# Patient Record
Sex: FEMALE | Race: WHITE | NOT HISPANIC OR LATINO | Employment: FULL TIME | URBAN - METROPOLITAN AREA
[De-identification: names, ages, dates, MRNs, and addresses within clinical notes are randomized per-mention and may not be internally consistent; named-entity substitution may affect disease eponyms.]

---

## 2017-07-21 ENCOUNTER — HOSPITAL ENCOUNTER (OUTPATIENT)
Dept: CARDIOLOGY | Facility: HOSPITAL | Age: 32
Discharge: HOME | End: 2017-07-21
Attending: INTERNAL MEDICINE | Admitting: INTERNAL MEDICINE

## 2018-09-24 ENCOUNTER — HOSPITAL ENCOUNTER (OUTPATIENT)
Dept: CARDIOLOGY | Facility: HOSPITAL | Age: 33
Discharge: HOME | End: 2018-09-24
Attending: INTERNAL MEDICINE | Admitting: INTERNAL MEDICINE

## 2018-10-24 ENCOUNTER — HOSPITAL ENCOUNTER (OUTPATIENT)
Dept: CARDIOLOGY | Facility: HOSPITAL | Age: 33
Discharge: HOME | End: 2018-10-24
Attending: INTERNAL MEDICINE | Admitting: INTERNAL MEDICINE

## 2018-10-26 ENCOUNTER — HOSPITAL ENCOUNTER (OUTPATIENT)
Dept: LAB | Facility: HOSPITAL | Age: 33
Discharge: HOME | End: 2018-10-26
Attending: INTERNAL MEDICINE | Admitting: INTERNAL MEDICINE

## 2018-10-26 LAB
MAGNESIUM SERPL-MCNC: 1.8 MG/DL (ref 1.8–2.5)
POTASSIUM SERPL-SCNC: 4.2 MMOL/L (ref 3.6–5.1)

## 2019-02-06 ENCOUNTER — HOSPITAL ENCOUNTER (OUTPATIENT)
Dept: SLEEP MEDICINE | Facility: HOSPITAL | Age: 34
Discharge: HOME | End: 2019-02-06
Attending: PSYCHIATRY & NEUROLOGY | Admitting: PSYCHIATRY & NEUROLOGY

## 2019-10-30 ENCOUNTER — OFFICE VISIT (OUTPATIENT)
Dept: NEUROLOGY | Facility: CLINIC | Age: 34
End: 2019-10-30

## 2019-10-30 VITALS
HEART RATE: 83 BPM | DIASTOLIC BLOOD PRESSURE: 84 MMHG | WEIGHT: 190.1 LBS | SYSTOLIC BLOOD PRESSURE: 127 MMHG | HEIGHT: 66 IN | BODY MASS INDEX: 30.55 KG/M2

## 2019-10-30 DIAGNOSIS — G47.50 PARASOMNIA, UNSPECIFIED TYPE: Primary | ICD-10-CM

## 2019-10-30 PROCEDURE — 99214 OFFICE O/P EST MOD 30 MIN: CPT | Performed by: PSYCHIATRY & NEUROLOGY

## 2019-10-30 RX ORDER — CHLORAL HYDRATE 500 MG
CAPSULE ORAL
COMMUNITY
Start: 2018-09-24

## 2019-10-30 RX ORDER — ERGOCALCIFEROL 1.25 MG/1
CAPSULE ORAL
COMMUNITY
Start: 2019-10-19

## 2019-10-30 NOTE — PROGRESS NOTES
Sleep medicine follow-up visit    Carolina Lara   1985  34 y.o. female   DATE OF SERVICE: 10/30/2019     Patient f/u with sleep disorder had sleep study on 02/06/19 which was normal and now having new issues     Patient having problems with waking up twitching and intense spasm and wakes, gasping for air, with great anxiety.  Happens about nightly, started about 3-6 months ago. She has gained some weight.     Patient is very fatigue and has a hard time getting and staying asleep. She is unable to take naps and has to take benadryl every night before bedtime.      has noted snoring and gasping noises .    On NPSG at [facility] , 02/06/2016 apnea-hypopnea index of 0 per sleep hour, This was undertaken of non sustained v tach and enlarged right atrium.     Review of Systems   Constitutional: Positive for fatigue. Negative for appetite change.   HENT: Negative for rhinorrhea and sinus pain.    Eyes: Negative for pain and itching.   Respiratory: Negative for cough and shortness of breath.    Cardiovascular: Positive for palpitations. Negative for chest pain.   Gastrointestinal: Negative for constipation and diarrhea.   Endocrine: Negative for cold intolerance and heat intolerance.   Genitourinary: Negative for difficulty urinating and frequency.   Musculoskeletal: Negative for back pain and neck pain.   Allergic/Immunologic: Negative for environmental allergies.   Neurological: Positive for light-headedness and headaches. Negative for dizziness, tremors, seizures, syncope, facial asymmetry, speech difficulty, weakness and numbness.   Psychiatric/Behavioral: Negative for agitation and confusion.     I reviewed and addressed ROS entered by MA./js        The following portions of the patient's history were reviewed and updated as appropriate: allergies, current medications, past family history, past medical history, past social history, past surgical history and problem list./js      Family History   Problem Relation  Age of Onset   • Migraines Mother    • Dementia Father        Past Medical History:   Diagnosis Date   • Rapid heart beat        Social History     Socioeconomic History   • Marital status:      Spouse name: Not on file   • Number of children: Not on file   • Years of education: Not on file   • Highest education level: Not on file   Tobacco Use   • Smoking status: Never Smoker   • Smokeless tobacco: Never Used   Substance and Sexual Activity   • Alcohol use: No     Frequency: Never   • Drug use: No         Current Outpatient Medications:   •  Magnesium Oxide -Mg Supplement 400 MG capsule, MAGNESIUM 400 MG CAPS, Disp: , Rfl:   •  Omega-3 Fatty Acids (FISH OIL) 1000 MG capsule capsule, FISH OIL 1000 MG CAPS, Disp: , Rfl:   •  vitamin D (ERGOCALCIFEROL) 1.25 MG (37571 UT) capsule capsule, , Disp: , Rfl:     Allergies   Allergen Reactions   • Levofloxacin Shortness Of Breath   • Morphine Other (See Comments)     Abdominal pain      • Prochlorperazine Delirium        PHYSICAL EXAMINATION:  Vitals:    10/30/19 0759   BP: 127/84   Pulse: 83      Body mass index is 30.68 kg/m².       HEENT: oropharynx, Christine class II  EXTREMITIES: No edema.     IMPRESSION:     Pt is having spells of jerking and spasms with arrousals, This may be due to a parasomnia or could be due to MASSIEL  Will request an in lab study for evaluation    RECOMMENDATIONS:   1. Continue present CPAP.   2. Follow up 1 year.     EPWORTH SLEEPINESS SCALE  Sitting and reading  0  WatchingTV  0  Sitting, inactive, in a public place  0  As a passenger in a car for 1 hour w/o a break  0  Lying down to rest in the afternoon  0  Sitting and talking to someone  0  Sitting quietly after a lunch  0  In a car, while stopped for traffic or a light  0  Total 0        This document has been electronically signed by Joseph Seipel, MD on October 30, 2019 8:15 AM

## 2019-12-06 ENCOUNTER — HOSPITAL ENCOUNTER (OUTPATIENT)
Dept: SLEEP MEDICINE | Facility: HOSPITAL | Age: 34
Discharge: HOME | End: 2019-12-06
Admitting: PSYCHIATRY & NEUROLOGY

## 2019-12-06 DIAGNOSIS — G47.50 PARASOMNIA, UNSPECIFIED TYPE: ICD-10-CM

## 2019-12-06 PROCEDURE — 95810 POLYSOM 6/> YRS 4/> PARAM: CPT

## 2019-12-10 PROCEDURE — 95810 POLYSOM 6/> YRS 4/> PARAM: CPT | Performed by: PSYCHIATRY & NEUROLOGY

## 2019-12-16 ENCOUNTER — TELEPHONE (OUTPATIENT)
Dept: CARDIOLOGY | Facility: CLINIC | Age: 34
End: 2019-12-16

## 2019-12-16 NOTE — TELEPHONE ENCOUNTER
Attempted to follow up with pt regarding missed appointment on 11/11/19.  Left a voice mail for pt to return call to the office to reschedule.

## 2019-12-30 ENCOUNTER — TELEPHONE (OUTPATIENT)
Dept: NEUROLOGY | Facility: CLINIC | Age: 34
End: 2019-12-30

## 2019-12-30 NOTE — TELEPHONE ENCOUNTER
Spoke with pt concerning sleep study. Results given. She declined follow up appt at this time stated she would follow up with Cardio. Results faxed to Danyelle Hernandez.

## 2020-01-20 ENCOUNTER — OFFICE VISIT (OUTPATIENT)
Dept: NEUROLOGY | Facility: CLINIC | Age: 35
End: 2020-01-20

## 2020-01-20 VITALS
DIASTOLIC BLOOD PRESSURE: 80 MMHG | SYSTOLIC BLOOD PRESSURE: 119 MMHG | HEART RATE: 71 BPM | HEIGHT: 66 IN | WEIGHT: 189 LBS | BODY MASS INDEX: 30.37 KG/M2

## 2020-01-20 DIAGNOSIS — G47.33 OBSTRUCTIVE SLEEP APNEA SYNDROME: Primary | ICD-10-CM

## 2020-01-20 PROBLEM — G47.30 SLEEP APNEA: Status: ACTIVE | Noted: 2019-02-14

## 2020-01-20 PROCEDURE — 99213 OFFICE O/P EST LOW 20 MIN: CPT | Performed by: PSYCHIATRY & NEUROLOGY

## 2020-01-20 NOTE — PROGRESS NOTES
Sleep medicine follow-up visit    Carolina Lara   1985  34 y.o. female   DATE OF SERVICE: 1/20/2020     Chief complaint:  Disturbed sleep, snoring    On NPSG at [facility] , 12/06/2019 patient had Mild obstructive sleep apnea syndrome with apnea-hypopnea index of 1.2 per sleep hour, minimum SpO2 of 87% rdi about 5 in supine position     Patient f/u from sleep study, This study demonstrates mild sleep disordered. Wakes with jerks at night, PLM index of about 2    Obese bmi 30.5    Review of Systems   Constitutional: Positive for fatigue. Negative for appetite change.   HENT: Negative for rhinorrhea and sinus pain.    Eyes: Negative for pain and itching.   Respiratory: Negative for cough and shortness of breath.    Cardiovascular: Positive for palpitations. Negative for chest pain.   Gastrointestinal: Negative for constipation and diarrhea.   Endocrine: Negative for cold intolerance and heat intolerance.   Genitourinary: Negative for difficulty urinating and frequency.   Musculoskeletal: Negative for back pain and neck pain.   Allergic/Immunologic: Negative for environmental allergies.   Neurological: Positive for light-headedness and headaches. Negative for dizziness, tremors, seizures, syncope, facial asymmetry, speech difficulty, weakness and numbness.   Psychiatric/Behavioral: Negative for agitation and confusion.     I reviewed and addressed ROS entered by MA.    The following portions of the patient's history were reviewed and updated as appropriate: allergies, current medications, past family history, past medical history, past social history, past surgical history and problem list.      Family History   Problem Relation Age of Onset   • Migraines Mother    • Dementia Father        Past Medical History:   Diagnosis Date   • Rapid heart beat    • Rapid heart beat        Social History     Socioeconomic History   • Marital status:      Spouse name: Not on file   • Number of children: Not on file   • Years  of education: Not on file   • Highest education level: Not on file   Tobacco Use   • Smoking status: Never Smoker   • Smokeless tobacco: Never Used   Substance and Sexual Activity   • Alcohol use: No     Frequency: Never   • Drug use: No         Current Outpatient Medications:   •  Magnesium Oxide -Mg Supplement 400 MG capsule, MAGNESIUM 400 MG CAPS, Disp: , Rfl:   •  Omega-3 Fatty Acids (FISH OIL) 1000 MG capsule capsule, FISH OIL 1000 MG CAPS, Disp: , Rfl:   •  vitamin D (ERGOCALCIFEROL) 1.25 MG (38297 UT) capsule capsule, , Disp: , Rfl:     Allergies   Allergen Reactions   • Levofloxacin Shortness Of Breath   • Morphine Other (See Comments)     Abdominal pain      • Prochlorperazine Delirium        PHYSICAL EXAMINATION:  Vitals:    01/20/20 1017   BP: 119/80   Pulse: 71      Body mass index is 30.51 kg/m².       HEENT: Normal.      EXTREMITIES: No edema.     IMPRESSION:   Minimal sleep disordered breathing during REM sleep and supine sleep  ---for this lose weight    Jerking during sleep,    ---no significant plms noted during recent sleep test    RECOMMENDATIONS:   1. Continue present CPAP.   2. Follow up 1 year.     EPWORTH SLEEPINESS SCALE  Sitting and reading  0  WatchingTV  0  Sitting, inactive, in a public place  0  As a passenger in a car for 1 hour w/o a break  0  Lying down to rest in the afternoon  0  Sitting and talking to someone  0  Sitting quietly after a lunch  0  In a car, while stopped for traffic or a light  0  Total 0          This document has been electronically signed by Joseph Seipel, MD on January 20, 2020 10:47 AM

## 2020-08-24 ENCOUNTER — OFFICE VISIT (OUTPATIENT)
Dept: NEUROLOGY | Facility: CLINIC | Age: 35
End: 2020-08-24

## 2020-08-24 VITALS
TEMPERATURE: 97.3 F | HEIGHT: 66 IN | DIASTOLIC BLOOD PRESSURE: 85 MMHG | BODY MASS INDEX: 29.77 KG/M2 | HEART RATE: 76 BPM | SYSTOLIC BLOOD PRESSURE: 127 MMHG | WEIGHT: 185.2 LBS

## 2020-08-24 DIAGNOSIS — G47.33 OBSTRUCTIVE SLEEP APNEA: Primary | ICD-10-CM

## 2020-08-24 DIAGNOSIS — G43.009 MIGRAINE WITHOUT AURA AND WITHOUT STATUS MIGRAINOSUS, NOT INTRACTABLE: ICD-10-CM

## 2020-08-24 DIAGNOSIS — G47.9 SLEEP DISTURBANCE: ICD-10-CM

## 2020-08-24 PROBLEM — N20.0 PREGNANCY COMPLICATED BY NEPHROLITHIASIS IN SECOND TRIMESTER, ANTEPARTUM: Status: ACTIVE | Noted: 2017-01-07

## 2020-08-24 PROBLEM — F32.A DEPRESSION: Status: ACTIVE | Noted: 2020-08-24

## 2020-08-24 PROBLEM — I47.29 NSVT (NONSUSTAINED VENTRICULAR TACHYCARDIA) (HCC): Status: ACTIVE | Noted: 2019-03-14

## 2020-08-24 PROBLEM — F41.9 ANXIETY DISORDER: Status: ACTIVE | Noted: 2020-08-24

## 2020-08-24 PROBLEM — I49.3 PVC'S (PREMATURE VENTRICULAR CONTRACTIONS): Status: ACTIVE | Noted: 2018-12-14

## 2020-08-24 PROBLEM — R42 LIGHTHEADEDNESS: Status: ACTIVE | Noted: 2018-12-14

## 2020-08-24 PROBLEM — O26.832 PREGNANCY COMPLICATED BY NEPHROLITHIASIS IN SECOND TRIMESTER, ANTEPARTUM: Status: ACTIVE | Noted: 2017-01-07

## 2020-08-24 PROBLEM — F90.9 ADHD: Status: ACTIVE | Noted: 2020-08-24

## 2020-08-24 PROBLEM — I51.7 ENLARGED RV (RIGHT VENTRICLE): Status: ACTIVE | Noted: 2019-03-14

## 2020-08-24 PROBLEM — Z34.90 PREGNANCY: Status: ACTIVE | Noted: 2017-04-19

## 2020-08-24 PROBLEM — N92.0 MENORRHAGIA: Status: ACTIVE | Noted: 2018-10-16

## 2020-08-24 PROBLEM — Z30.09 UNWANTED FERTILITY: Status: ACTIVE | Noted: 2020-08-24

## 2020-08-24 PROCEDURE — 99214 OFFICE O/P EST MOD 30 MIN: CPT | Performed by: PSYCHIATRY & NEUROLOGY

## 2020-08-24 RX ORDER — ACARBOSE 25 MG/1
TABLET ORAL
COMMUNITY
Start: 2020-05-29

## 2020-08-24 RX ORDER — ZOLPIDEM TARTRATE 5 MG/1
TABLET ORAL
Qty: 1 TABLET | Refills: 0 | Status: SHIPPED | OUTPATIENT
Start: 2020-08-24

## 2020-08-24 NOTE — PROGRESS NOTES
Sleep medicine follow-up visit    Carolina Lara   1985  35 y.o. female   DATE OF SERVICE: 8/24/2020     Wakes with jerks at night, PLM index of about 2.   Patent here today to consult about having more cycles of waking up with jerks during her sleep.   Has fewer jerks with magnesium  Feels exhausted.    On NPSG at [facility] , 12/06/2019 patient had no significant  obstructive sleep apnea syndrome with apnea-hypopnea index of 1.2 per sleep hour, minimum SpO2 of 87% rdi about 5 in supine position     Obesity, improved  BMI-29.8    Pt has daily headaches, wakes with ha, improved with coffee, sometime wakes up with neck pain, with headache  Location of headache is frontal, ache.   Sometimes has head ache latter in day  Once per month has headache in front to temporal area. With nausea, and noise sensitivity  Onset of migraine as teen.     Pt has fatigue daily, enlarged right ventricle.    noted snorting and gasping    Review of Systems   Constitutional: Positive for fatigue. Negative for appetite change.   HENT: Negative for rhinorrhea and sinus pain.    Eyes: Negative for pain and itching.   Respiratory: Negative for cough and shortness of breath.    Cardiovascular: Positive for palpitations. Negative for chest pain.   Gastrointestinal: Negative for constipation and diarrhea.   Endocrine: Negative for cold intolerance and heat intolerance.   Genitourinary: Negative for difficulty urinating and frequency.   Musculoskeletal: Negative for back pain and neck pain.   Allergic/Immunologic: Negative for environmental allergies.   Neurological: Positive for light-headedness and headaches. Negative for dizziness, tremors, seizures, syncope, facial asymmetry, speech difficulty, weakness and numbness.   Psychiatric/Behavioral: Negative for agitation and confusion.     I reviewed and addressed ROS entered by MA.      The following portions of the patient's history were reviewed and updated as appropriate: allergies,  current medications, past family history, past medical history, past social history, past surgical history and problem list.      Family History   Problem Relation Age of Onset   • Migraines Mother    • Dementia Father        Past Medical History:   Diagnosis Date   • Rapid heart beat    • Rapid heart beat        Social History     Socioeconomic History   • Marital status:      Spouse name: Not on file   • Number of children: Not on file   • Years of education: Not on file   • Highest education level: Not on file   Tobacco Use   • Smoking status: Never Smoker   • Smokeless tobacco: Never Used   Substance and Sexual Activity   • Alcohol use: No     Frequency: Never   • Drug use: No   • Sexual activity: Defer         Current Outpatient Medications:   •  acarbose (PRECOSE) 25 MG tablet, , Disp: , Rfl:   •  Magnesium Oxide -Mg Supplement 400 MG capsule, MAGNESIUM 400 MG CAPS, Disp: , Rfl:   •  Omega-3 Fatty Acids (FISH OIL) 1000 MG capsule capsule, FISH OIL 1000 MG CAPS, Disp: , Rfl:   •  vitamin D (ERGOCALCIFEROL) 1.25 MG (58064 UT) capsule capsule, , Disp: , Rfl:     Allergies   Allergen Reactions   • Levofloxacin Shortness Of Breath   • Morphine Other (See Comments)     Abdominal pain      • Prochlorperazine Delirium        PHYSICAL EXAMINATION:    Vitals:    08/24/20 0903   BP: 127/85   Pulse: 76   Temp: 97.3 °F (36.3 °C)      Body mass index is 29.89 kg/m².       HEENT: Normal.      EXTREMITIES: No edema.     IMPRESSION: plan  Possible heidi , will repeat sleep test  Leg and body jerks, myoclonus  Migraine  Morning headaches and daytime fatigue        EPWORTH SLEEPINESS SCALE  Sitting and reading  0  WatchingTV  0  Sitting, inactive, in a public place  0  As a passenger in a car for 1 hour w/o a break  0  Lying down to rest in the afternoon  3  Sitting and talking to someone  0  Sitting quietly after a lunch  0  In a car, while stopped for traffic or a light  0  Total 3          This document has been  electronically signed by Joseph Seipel, MD on August 24, 2020 09:28

## 2020-09-26 ENCOUNTER — APPOINTMENT (OUTPATIENT)
Dept: SLEEP MEDICINE | Facility: HOSPITAL | Age: 35
End: 2020-09-26

## 2020-10-24 ENCOUNTER — HOSPITAL ENCOUNTER (OUTPATIENT)
Dept: SLEEP MEDICINE | Facility: HOSPITAL | Age: 35
End: 2020-10-24

## 2022-12-03 ENCOUNTER — HOSPITAL ENCOUNTER (OUTPATIENT)
Facility: HOSPITAL | Age: 37
Discharge: HOME | End: 2022-12-03
Attending: EMERGENCY MEDICINE

## 2022-12-03 ENCOUNTER — APPOINTMENT (OUTPATIENT)
Dept: GENERAL RADIOLOGY | Facility: HOSPITAL | Age: 37
End: 2022-12-03

## 2022-12-03 VITALS
BODY MASS INDEX: 28.93 KG/M2 | HEIGHT: 66 IN | SYSTOLIC BLOOD PRESSURE: 128 MMHG | HEART RATE: 96 BPM | TEMPERATURE: 99 F | DIASTOLIC BLOOD PRESSURE: 86 MMHG | RESPIRATION RATE: 20 BRPM | WEIGHT: 180 LBS | OXYGEN SATURATION: 98 %

## 2022-12-03 DIAGNOSIS — J10.1 INFLUENZA A: Primary | ICD-10-CM

## 2022-12-03 DIAGNOSIS — J06.9 UPPER RESPIRATORY TRACT INFECTION, UNSPECIFIED TYPE: ICD-10-CM

## 2022-12-03 LAB
FLUAV SUBTYP SPEC NAA+PROBE: DETECTED
FLUBV RNA ISLT QL NAA+PROBE: NOT DETECTED
SARS-COV-2 RNA RESP QL NAA+PROBE: NOT DETECTED

## 2022-12-03 PROCEDURE — EDLOS: Performed by: EMERGENCY MEDICINE

## 2022-12-03 PROCEDURE — 71046 X-RAY EXAM CHEST 2 VIEWS: CPT | Performed by: EMERGENCY MEDICINE

## 2022-12-03 PROCEDURE — 71046 X-RAY EXAM CHEST 2 VIEWS: CPT

## 2022-12-03 PROCEDURE — 99203 OFFICE O/P NEW LOW 30 MIN: CPT | Performed by: EMERGENCY MEDICINE

## 2022-12-03 PROCEDURE — G0463 HOSPITAL OUTPT CLINIC VISIT: HCPCS | Performed by: EMERGENCY MEDICINE

## 2022-12-03 PROCEDURE — 87636 SARSCOV2 & INF A&B AMP PRB: CPT | Performed by: EMERGENCY MEDICINE

## 2022-12-03 RX ORDER — OSELTAMIVIR PHOSPHATE 75 MG/1
75 CAPSULE ORAL 2 TIMES DAILY
Qty: 10 CAPSULE | Refills: 0 | Status: SHIPPED | OUTPATIENT
Start: 2022-12-03 | End: 2022-12-08

## 2022-12-03 NOTE — FSED PROVIDER NOTE
Subjective   History of Present Illness  Patient is a 37-year-old who has a history of COVID admission here ago.  She was on BiPAP.  She has had upper respiratory symptoms.  She states she had a virus all week.  She started to get better but now she got worse.  She did not see her doctor.  She stayed home.  Symptoms are mild to moderate.  She is complaining of a cough.        Review of Systems   Constitutional: Negative.    HENT: Negative.  Negative for rhinorrhea and sore throat.    Eyes: Negative.    Respiratory: Positive for cough. Negative for chest tightness and shortness of breath.    Cardiovascular: Negative.    Gastrointestinal: Negative.  Negative for abdominal pain, diarrhea, nausea and vomiting.   Endocrine: Negative.    Genitourinary: Negative.  Negative for dysuria.   Musculoskeletal: Negative for back pain.   Skin: Negative.    Allergic/Immunologic: Negative.    Neurological: Negative.    Hematological: Negative.    Psychiatric/Behavioral: Negative.    All other systems reviewed and are negative.      Past Medical History:   Diagnosis Date   • Rapid heart beat    • Rapid heart beat        Allergies   Allergen Reactions   • Levofloxacin Shortness Of Breath   • Morphine Other (See Comments)     Abdominal pain      • Prochlorperazine Delirium       Past Surgical History:   Procedure Laterality Date   • GALLBLADDER SURGERY     • TONSILECTOMY, ADENOIDECTOMY, BILATERAL MYRINGOTOMY AND TUBES     • TUBAL ABDOMINAL LIGATION         Family History   Problem Relation Age of Onset   • Migraines Mother    • Dementia Father        Social History     Socioeconomic History   • Marital status:    Tobacco Use   • Smoking status: Never   • Smokeless tobacco: Never   Substance and Sexual Activity   • Alcohol use: No   • Drug use: No   • Sexual activity: Defer           Objective   Physical Exam  Vitals and nursing note reviewed.   Constitutional:       Appearance: She is well-developed and normal weight.   HENT:       Head: Normocephalic and atraumatic.      Right Ear: External ear normal.      Left Ear: External ear normal.      Nose: Nose normal.      Mouth/Throat:      Mouth: Mucous membranes are moist.      Pharynx: Oropharynx is clear.   Eyes:      Extraocular Movements: Extraocular movements intact.      Pupils: Pupils are equal, round, and reactive to light.   Cardiovascular:      Rate and Rhythm: Normal rate and regular rhythm.      Pulses: Normal pulses.      Heart sounds: Normal heart sounds.   Pulmonary:      Effort: Pulmonary effort is normal.      Breath sounds: Normal breath sounds.   Abdominal:      General: Abdomen is flat.      Palpations: Abdomen is soft.      Tenderness: There is no abdominal tenderness.   Musculoskeletal:         General: Normal range of motion.      Cervical back: Normal range of motion and neck supple.   Skin:     General: Skin is warm and dry.   Neurological:      General: No focal deficit present.      Mental Status: She is alert and oriented to person, place, and time. Mental status is at baseline.   Psychiatric:         Mood and Affect: Mood normal.         Behavior: Behavior normal.         Thought Content: Thought content normal.         Judgment: Judgment normal.         Procedures           ED Course  ED Course as of 12/03/22 0757   Sat Dec 03, 2022   0752 The patient is stable.  The patient has flu.  The patient's flu was positive for a.  There is no signs of hypoxia or sepsis. [CT]   0753 COVID-19 and FLU A/B PCR - Swab, Nasopharynx(!) [CT]      ED Course User Index  [CT] Ellis Aj MD                                           MDM  Number of Diagnoses or Management Options  Upper respiratory tract infection, unspecified type  Diagnosis management comments: Upper respiratory symptoms.    No signs of hypoxia.  No signs of dehydration or respiratory distress.  The patient get a chest x-ray as well as be tested for flu and COVID.       Amount and/or Complexity of Data  Reviewed  Clinical lab tests: reviewed  Tests in the radiology section of CPT®: reviewed  Independent visualization of images, tracings, or specimens: yes    Risk of Complications, Morbidity, and/or Mortality  General comments: X-ray was read as unremarkable.  The patient has flu A.  The patient is stable for release        Final diagnoses:   Upper respiratory tract infection, unspecified type   Influenza A       ED Disposition  ED Disposition     ED Disposition   Discharge    Condition   Stable    Comment   --             Karla Almeida MD  [address] [facility] [address]  [place] [facility]  [phone]    Schedule an appointment as soon as possible for a visit in 2 days  As needed         Medication List      New Prescriptions    oseltamivir 75 MG capsule  Commonly known as: Tamiflu  Take 1 capsule by mouth 2 (Two) Times a Day for 5 days.           Where to Get Your Medications      These medications were sent to [facility] [id number] - [place], [place] - [address] [place] [address] - [phone]  - [phone] [address] [place] [address]    Phone: [phone]   · oseltamivir 75 MG capsule